# Patient Record
Sex: MALE | Race: WHITE
[De-identification: names, ages, dates, MRNs, and addresses within clinical notes are randomized per-mention and may not be internally consistent; named-entity substitution may affect disease eponyms.]

---

## 2021-04-05 ENCOUNTER — HOSPITAL ENCOUNTER (EMERGENCY)
Dept: HOSPITAL 35 - ER | Age: 71
LOS: 1 days | Discharge: PSYCHIATRIC OR SUBSTANCE USE FACILITY | End: 2021-04-06
Payer: COMMERCIAL

## 2021-04-05 VITALS — BODY MASS INDEX: 25.06 KG/M2 | HEIGHT: 72 IN | WEIGHT: 185.01 LBS

## 2021-04-05 DIAGNOSIS — Z95.1: ICD-10-CM

## 2021-04-05 DIAGNOSIS — K21.9: ICD-10-CM

## 2021-04-05 DIAGNOSIS — F23: Primary | ICD-10-CM

## 2021-04-05 DIAGNOSIS — Z20.822: ICD-10-CM

## 2021-04-05 DIAGNOSIS — J44.9: ICD-10-CM

## 2021-04-06 ENCOUNTER — HOSPITAL ENCOUNTER (INPATIENT)
Dept: HOSPITAL 35 - SBH | Age: 71
LOS: 10 days | Discharge: HOME | DRG: 885 | End: 2021-04-16
Attending: PSYCHIATRY & NEUROLOGY | Admitting: PSYCHIATRY & NEUROLOGY
Payer: COMMERCIAL

## 2021-04-06 VITALS — DIASTOLIC BLOOD PRESSURE: 84 MMHG | SYSTOLIC BLOOD PRESSURE: 148 MMHG

## 2021-04-06 VITALS — SYSTOLIC BLOOD PRESSURE: 146 MMHG | DIASTOLIC BLOOD PRESSURE: 86 MMHG

## 2021-04-06 VITALS — WEIGHT: 185.9 LBS | BODY MASS INDEX: 25.18 KG/M2 | HEIGHT: 72 IN

## 2021-04-06 VITALS — DIASTOLIC BLOOD PRESSURE: 78 MMHG | SYSTOLIC BLOOD PRESSURE: 125 MMHG

## 2021-04-06 VITALS — SYSTOLIC BLOOD PRESSURE: 123 MMHG | DIASTOLIC BLOOD PRESSURE: 80 MMHG

## 2021-04-06 DIAGNOSIS — Z87.891: ICD-10-CM

## 2021-04-06 DIAGNOSIS — Z95.1: ICD-10-CM

## 2021-04-06 DIAGNOSIS — Z85.46: ICD-10-CM

## 2021-04-06 DIAGNOSIS — Z98.52: ICD-10-CM

## 2021-04-06 DIAGNOSIS — K21.9: ICD-10-CM

## 2021-04-06 DIAGNOSIS — J02.9: ICD-10-CM

## 2021-04-06 DIAGNOSIS — J43.9: ICD-10-CM

## 2021-04-06 DIAGNOSIS — E78.5: ICD-10-CM

## 2021-04-06 DIAGNOSIS — I25.10: ICD-10-CM

## 2021-04-06 DIAGNOSIS — F43.10: ICD-10-CM

## 2021-04-06 DIAGNOSIS — F31.2: Primary | ICD-10-CM

## 2021-04-06 DIAGNOSIS — F12.90: ICD-10-CM

## 2021-04-06 DIAGNOSIS — I10: ICD-10-CM

## 2021-04-06 DIAGNOSIS — M19.90: ICD-10-CM

## 2021-04-06 DIAGNOSIS — F23: ICD-10-CM

## 2021-04-06 PROCEDURE — 10880: CPT

## 2021-04-06 NOTE — NUR
Assumed pt care at 0700. pt was alert and oriented x4. Ambulates with a steady
gait. participates in groups. took medication whole without difficulty.
Assessments completed,vss.no sign of acute distress noted upon assessments.
DEnies si/hi. denies pain. Pt was being inappropriate most of the day. pt
touches the female pts, and staffs inappropriately. pt was redirected
severally by staff. pt was told to stop touching other pts. pt continues to be
inappropriate, loud, unco-operative and disruptive. At 0906 pt got 15mg IM of
Geodon. At this time pt is in the day room. will continue to monitor pt.

## 2021-04-06 NOTE — NUR
WRITER WAS A WITNESS VIA PHONE FOR THE REENACTMENT OF DPOA PAPER WORK WITH
WIFE VIVEK REAVES. WIFE VOICED UNDERSTANDING AND AGREES WITH DR SESAY
PLAN OF CARE.

## 2021-04-06 NOTE — NUR
RECEIVED REPORT FROM ELOY WESTFALL IN ED. PT ARRIVED ON UNIT 0430 PT AAOX4, VS
B/P 146/86, P 55, R 18, T 97.2, 99% RA RR EVEN AND NONLABORED. PT LUNGS CLEAR,
HT S1, S2 RR, ABD ACTIVE. PT DENIES SI/HI AND PAIN. PT PRESENTS IN MANIC
STATE, EASILY REDIRECTED. HCP RINKU CANCHOLA MD AND FINA MONGE NP MESSAGE LEFT. PT
HX BIPOLAR, PTSD, COPD, GERD, OA, PROSTATE CA, CABG, PROSTECTOMY, C SPINE
SURGERY. ZERO S/S OF ACUTE [id number] NOTED, PT WILL CONTINUE TO BE MONITOR PER
[facility] PROTOCOL.

## 2021-04-06 NOTE — NUR
Assess due to admit to [facility].  Manic, hx bipolar, PTSD, CABG, substance abuse.
Eating 100% of first few meals on unit.  Wt status adequate.  Low nutrition
risk

## 2021-04-06 NOTE — NUR
PHAM sat in a meeting with pt and Dr. Mcintyre. Pt appeared very manic and
talked about a variety of topics. He admitted he uses LSD and marijuana. Pt
also said he was in residential in the early 1970s, but he did not say why. Pt also
says he is a . He says both his brother and his father suffered
from manic like MH dx.
 
PHAM and Dr. Mcintyre spoke with Blank his wife who said she is his DPOA. A
nursing staff gave PHAM the document. She confirmed that pt does use LSD and
marijuana. 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
formerly and acts like he has Alzheimers. She said he annoys anyone who is not
his family. She has asked that pt not be allowed to contact anyone that is not
family.
 
PHAM team will continue to follow pt during his stay on this unit.

## 2021-04-07 VITALS — DIASTOLIC BLOOD PRESSURE: 80 MMHG | SYSTOLIC BLOOD PRESSURE: 123 MMHG

## 2021-04-07 VITALS — SYSTOLIC BLOOD PRESSURE: 132 MMHG | DIASTOLIC BLOOD PRESSURE: 81 MMHG

## 2021-04-07 VITALS — SYSTOLIC BLOOD PRESSURE: 134 MMHG | DIASTOLIC BLOOD PRESSURE: 77 MMHG

## 2021-04-07 NOTE — NUR
PHAM received a msg from [place] asking for clarification on if pt is on a 96hr
hold.
 
PHAM and Dr. Mcintyre contacted Blank and explained to her that pt signed in
willingly; the possibility of a 96 came from [facility] and was not needed
since pt signed himself in. Yesterday, pt wanted to sign himself out but was
still very manic so Dr. Mcintyre enacted his DPOA so that he could not, and
the unit will now defer to Blank for decisions. Blank said she is in
agreement with that plan; she said that he is not to leave until the
psychiatrist says he is ready.
 
PHAM team will continue to follow pt during his stay on this unit.

## 2021-04-07 NOTE — NUR
THIS ASSUMED CARE AT 0700 THIS MORNING.  HE IS HYPERVERVAL.  HE IS ATTEMPTING
TO TAKE CARE OF PEERS. STAFF HAS BEEN ASKED BY STAFF NOT TO CARETAKE OTHERS.
HE SEEMED UPSET WITH THIS AND SAT IN A CHAIR BY HIMSELF.  HE ATTENDED MORNING
MEETING AND WAS INTERESTED IN TELLING THE STAFF IF THEY WERE DOING A GOOD JOB
OR NOT. ASSESSMENT IS UNREMARKABLE THIS MORNING.  HE TOOK HIS MORNING
MEDICATIONS WITHOUT PROBLEMS NOTED.

## 2021-04-08 VITALS — DIASTOLIC BLOOD PRESSURE: 81 MMHG | SYSTOLIC BLOOD PRESSURE: 117 MMHG

## 2021-04-08 VITALS — DIASTOLIC BLOOD PRESSURE: 80 MMHG | SYSTOLIC BLOOD PRESSURE: 128 MMHG

## 2021-04-08 VITALS — SYSTOLIC BLOOD PRESSURE: 117 MMHG | DIASTOLIC BLOOD PRESSURE: 81 MMHG

## 2021-04-08 NOTE — H
[facility]
[address]   [address]                     HISTORY AND PHYSICAL          
_______________________________________________________________________________
 
Name:       KAYLA REAVES                Room #:         517-A       ADM IN  
M.R.#:      [id number]                       Account #:      [id number]  
Admission:  04/06/21    Attend Phys:    Dalton Mcintyre DO
Discharge:              Date of Birth:  [birth year]  
                                                          Report #: [id number]
                                                                    [id number]   
_______________________________________________________________________________
THIS REPORT FOR:  
 
cc:  Michael Arroyo, Michael Arenas,Dalton CHOWDARY DO                                        ~
 
DATE OF SERVICE:  04/06/2021
 
 
INPATIENT PSYCHIATRIC EVALUATION
 
ATTENDING PHYSICIAN:  Dalton Mcintyre DO.
 
MEDICAL CONSULTANT:  Radha Samano and Zac Mcallister MD, Hospitalist service.
 
REASON FOR ADMISSION:  Acutely manic, patient is voluntary.
 
SOURCES OF INFORMATION:  [facility], telephone conversation
with Dr. Tee Agrawal, psychiatrist from the [facility], telephone conversation
with his wife, Tameka.
 
HISTORY OF PRESENT ILLNESS:  A 70-year-old   male who [facility] from Dr. Tee Agrawal, [facility] and he was in manic state, noncompliant with his medicines
experiencing flight of ideas.  His daughter describes increasingly impulsive
behavior and it is my concern that it is unsure if this patient presents as
grave danger to himself.  Second affidavit was from Emiliazena More, I believe is his
daughter, that one says the patient presents the likelihood of serious harm to
self.  He states he would like to have a near death experience.  He called the
police on himself to evaluate him and asked me if they would shoot him.  He has
gone to his general practitioner.  I believe he is looking for an
altercation with the police including possible violence, so they can induce his
"near death experience" by forcing them to shoot him.  The patient presents to
the likelihood of serious harm to his spouse.  His behavior is erratic as
threatening with his body.  He puts wife
in the corners.  He screams at her with such
force that spit flies from his mouth.  He says in the past when having a similar
manic episode, she tried to leave, and he would not let her and follows her next
door.  The daughter lives next door, screams outside the door.  He reports he
has Alzheimer's to others and is a danger, this is not true.  Additional
information, patient denied SI and psychosis.  However, he was hyperverbal,
tangential, grandiose, inappropriately and stated he is homicidal towards
Misael Monsalve, but he is not planning on doing anything.  The patient appears to
be ruminating over "a near death experience."  I believe there is nothing wrong
with him.  No history of past suicide attempt.  The patient reports his last
psychiatric hospitalization was 1974.
 
 
 
[facility]
[address] [place]   [address]                     HISTORY AND PHYSICAL          
_______________________________________________________________________________
 
Name:       KAYLA REAVES                Room #:         517-A       [facility]  
..#:      [id number]                       Account #:      [id number]  
Admission:  04/06/21    Attend Phys:    Dalton Mcintyre DO
Discharge:              Date of Birth:  [birth year]  
                                                          Report #: [id number]
                                                                    [id number]   
_______________________________________________________________________________
 
 
PAST MEDICAL HISTORY:  Biceps tendinitis of left upper extremity, cervical disk
disease, cervical spondylosis without myelopathy, chronic left shoulder pain,
COPD, coronary atherosclerosis of native coronary artery, esophageal reflux,
history of leukocytosis.
 
PAST SURGICAL HISTORY:  Vasectomy, prostate biopsy, Meniere's disease, mixed
hyperlipidemia, myelopathy, osteoarthritis, prostate cancer, visual impairment.
 
SURGERIES:  CABG without cardiopulmonary pump oxygenation left inferior
mesenteric artery to left anterior descending.
 
SURGEON:  Lane Pacheco, July 2018.
 
MORE 
PAST SURGICAL HISTORY:  He has had a cardiac catheterization, left cervical
discectomy, CABG transesophageal July 2018, knee surgery, right knee
replacement, prostatectomy, October 2018.
 
SOCIAL HISTORY:  Former smoker.  He smoked 2 packs a day for 40 pack years, quit
in 1999.  Never used smokeless tobacco, has daily marijuana use.
 
LABORATORY DATA:  UDS was positive for THC.  Urinalysis showed small bilirubin,
negative ketones, specific gravity was greater than 1, negative nitrites. 
Alcohol less than 10.  CBC:  White count 7.64, H and H of 15.6 and 46, platelets
279.  Sodium 138, potassium 3.5, chloride 107, bicarbonate 22, anion gap 9,
calcium 8.7, glucose 122, total protein 7.0, albumin 4.0, alkaline phosphatase
97.  COVID PCR is negative, got 5 mg of Haldol, 2 mg Ativan in the ER.  
 
From wife: 
  This is the first time the patient has been
going around the community, particularly bothering people in a manic state.  He
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  EKG was done,
ventricular rate 86, QTc 425, NC interval 128.
 
PHYSICAL EXAMINATION:
VITAL SIGNS:  Here at [place], temperature 36.1, pulse 82, respirations 18,
/84, O2 sat 98%.
MUSCULOSKELETAL:  Unkempt,  male,
increased pace of gait.  Normal station.
 
MENTAL STATUS EXAMINATION:  This is a well-developed  male, appearing
stated age with long curly hair.  Attention and concentration limited, easily
distractible.  Speech pressured.  Thought process linear at times, becoming
tangential.  Thought content delusional-  about what he has been doing
 
 
 
[facility]
[address] [place]   [address]                     HISTORY AND PHYSICAL          
_______________________________________________________________________________
 
Name:       KAYLA REAVES                Room #:         517-A       [facility] [place]  
..#:      [id number]                       Account #:      [id number]  
Admission:  04/06/21    Attend Phys:    Dalton Mcintyre, 
Discharge:              Date of Birth:  [birth year]  
                                                          Report #: [id number]
                                                                    [id number]   
_______________________________________________________________________________
 
compared to what his family. mood/affect- euphoric, grandiose- manic,congruent
Medical record document very disorganized.  Denied
SI or HI.  Denied auditory, visual, or tactile hallucinations.  Memory not
formally tested.  Insight impaired, judgment impaired.  Fund of knowledge, no
greater than average.
 
FORMULATION:  A 70-year-old  male, history of bipolar 1 disorder,
presenting frankly manic, his comorbidities including coronary artery disease,
degenerative disk disease.
 
DIAGNOSES:  At this time, bipolar 1 disorder, most recent episode manic with
psychotic features, substance use disorder for marijuana, substance use disorder
from tobacco by history.
 
PLAN:  Evaluate, stabilize, obtain collateral.  Regarding the patient's
medications, we had him on aspirin, atorvastatin and famotidine.  We are going
to go ahead and start him on Depakote 500 mg twice a day, Haldol 2 mg 3 times a
day to see if we can get this kacey under control.  Right now he is voluntary. 
At this point, he will not be allowed to leave against medical advice.
 
Time spent on this case is greater than 70 minutes, greater than 50% time spent
on review of records, coordination of care.
 
STRENGTHS:  insured, supportive family.
 
WEAKNESSES:  Advanced age, medical comorbidities.
 
 
 
 
 
 
 
 
 
 
 
 
 
 
 
 
 
  <ELECTRONICALLY SIGNED>
   By: Dalton Mcintyre DO        
  04/08/21     2110
D: 04/06/21 1304                           _____________________________________
T: 04/06/21 1400                           Dalton Mcintyre DO          /nt

## 2021-04-08 NOTE — NUR
ASSUMED CARE AT 0700 THIS MORNING.  PT. COOPERATIVE WITH ASSESSMENT AND
MEDICATIONS.  HE STATES HE FEELS BETTER TODAY AND FEELS THE MEDICATIONS ARE
WORKING.  HE THEN STATES HE THINKS HE SHOULD BE DISCHARGED BY FRIDAY THIS
WEEK.  THIS WRITER TALKED TO HIM THAT ONE POSITIVE REPORT IS NOT TELL US ALL
WE NEED TO KNOW AND HE STILL NEEDS TO BE UNDER OBSERVATION.  HE ATTENDED
MORNING GROUP.  NO NEW PROBLEMS NOTED OR VOICED.  WILL CONTINUE TO MONITOR.

## 2021-04-08 NOTE — NUR
4-7-21 CARE TRANSFERRED 1900 OBSERVED PT SITTING IN DAY ROOM SOCIALIZING WITH
PEER. LATER PT AAOX4, VSS, RR EVEN AND NONLABORED ON RA, PT DENIES PAIN AND
SI/HI. PT HAS REMAINED CALM AND COOPERATIVE. ZERO S/S OF ACUTE DISTRESS NOTED,
PT WILL CONTINUE TO BE MONITOR PER [facility] PROTOCOL.

## 2021-04-09 VITALS — DIASTOLIC BLOOD PRESSURE: 86 MMHG | SYSTOLIC BLOOD PRESSURE: 127 MMHG

## 2021-04-09 VITALS — SYSTOLIC BLOOD PRESSURE: 127 MMHG | DIASTOLIC BLOOD PRESSURE: 86 MMHG

## 2021-04-09 VITALS — DIASTOLIC BLOOD PRESSURE: 81 MMHG | SYSTOLIC BLOOD PRESSURE: 132 MMHG

## 2021-04-09 NOTE — NUR
Assumed care on 04/08/21 @ 1900, cooperative with care a&Marvel4, ANIA, took meds
whole. Ambulates with a steady gait.

## 2021-04-09 NOTE — NUR
PT SITTING IN DINING ROOM EATING BREAKFAST. PT ASKED WHAT MEDS HE WAS TAKING.
TOLD PT WHAT EACH ONE MED WAS AND HE DID TAKE THEM. PT COOROPERATIVE WITH MEDS
THIS AM. NO SIGNS OF ANXIETY.

## 2021-04-09 NOTE — NUR
PT WAS DRAWING A PLAN FOR A FESTIVAL AT [place] FOR CONCERTS HE WANTS TO
PLAN. HE HAS A DRAWING OF THE RIVER AND WHERE THE STAGE IS AND ALSO WHERE THE
VENDORS WOULD BE. PT STATED IT WOULD COST AROUND $45,000.

## 2021-04-09 NOTE — NUR
PHAM spoke with wife Blank and scheduled an in-person family meeting on 04/13
@11:45; this will occur after she visits with pt to see if he is closer to his
baseline. She voiced a few issues to PHAM such as pt has license to grow weed
for him and 2 other people. She said he grows a "sativa" strand which gives
energy, and she is afraid of him smoking that. She says she absolutely does
not want him to smoke that. She asked SW if she could destoy those plants. PHAM
explained she does not know, but that can be discussed at the . She said ok.
 
PHAM team will continue to follow pt during his stay on this unit.

## 2021-04-09 NOTE — NUR
PT HAS HAD A GOOD DAY TODAY. NO SIGNS OF LUIZ EXCEPT FOR THE CONCERT
PLANNING. PT VERY TALKATIVE WITH OTHER PATIENTS AND PERSONABLE.

## 2021-04-10 VITALS — SYSTOLIC BLOOD PRESSURE: 135 MMHG | DIASTOLIC BLOOD PRESSURE: 99 MMHG

## 2021-04-10 VITALS — DIASTOLIC BLOOD PRESSURE: 89 MMHG | SYSTOLIC BLOOD PRESSURE: 139 MMHG

## 2021-04-10 VITALS — DIASTOLIC BLOOD PRESSURE: 99 MMHG | SYSTOLIC BLOOD PRESSURE: 133 MMHG

## 2021-04-10 NOTE — NUR
1110 RESUMMED CARE FROM OVERNIGHT SHIFT THIS AM, PATIENT IN DAY ROOM SITTING
QUIETLY IN BALDEV CHAIR. PATIENT ATE BREAKFAST TOOK MEDICATION WITHOUT
INCIDENCE. PATIENT ALERT ORIENTED TIMES 4 PATIENT DENIES SI/HI/AH/VH AT
PRESENT. PATIENTS ABDOMEN SOFT BOWEL SOUNDS PRESENT PATIENTS LUNGS CLEAR.
PATIENT CALM COOPERATIVE PATIENT SPOKE WITH HIS WIFE CONVERSATION APPROPRIATE.
WILL CONTINUE TO MONITOR PATIENT FOR SAFETY AND BEHAVIORS.

## 2021-04-11 VITALS — SYSTOLIC BLOOD PRESSURE: 128 MMHG | DIASTOLIC BLOOD PRESSURE: 86 MMHG

## 2021-04-11 VITALS — SYSTOLIC BLOOD PRESSURE: 126 MMHG | DIASTOLIC BLOOD PRESSURE: 78 MMHG

## 2021-04-11 NOTE — NUR
PHAM met with pt to discuss his concerns re: when he will discharge. SW informed
that discharge would take place at some point after the family meeting on
Tuesday. Pt also inquired about wearing his shoes. SW discussed his request
with his nursing staff.
 
SW team will remain available.

## 2021-04-11 NOTE — NUR
1137 RESUMMED CARE FROM OVERNIGHT SHIFT THIS AM, PATIENT IN DAY ROOM ASLEEP IN
BALDEV CHAIR. PATIENT DID NOT WANT TO SLEEP IN HIS BED LAST NIGHT; PATIENT ALERT
ORIENTED TIMES 4. PATIENT DENIES SI/HI/AH/VH AT PRESENT PATIENTS ABDOMEN SOFT
BOWEL SOUNDS PRESENT. PATIENTS LUNGS CLEAR PATIENT HAS NOT DISPLAYED ANY
BEHAVIORS. SOMETIMES PARTICIPATES IN GROUPS WILL CONTINUE TO MONITOR PATIENT
FOR SAFETY AND BEHAVIORS.

## 2021-04-11 NOTE — NUR
Assumed care on 04/10/21 @ 2100, A&Ox1 pleasant affect noted, denies
HA/HV/SI/HI. Called wife and complied with request to speak to her within the
area near the nurses desk, so that staff is able to monitor calls for
apropriate interaction, and to prevent additional calls to unknown others. PRN
Tylenol 650 provided @ 2000 for generalized pain. Slept in wilian chair in the
day room that he positioned in the corner at the far end of the room.

## 2021-04-12 VITALS — SYSTOLIC BLOOD PRESSURE: 141 MMHG | DIASTOLIC BLOOD PRESSURE: 87 MMHG

## 2021-04-12 VITALS — DIASTOLIC BLOOD PRESSURE: 87 MMHG | SYSTOLIC BLOOD PRESSURE: 141 MMHG

## 2021-04-12 VITALS — DIASTOLIC BLOOD PRESSURE: 88 MMHG | SYSTOLIC BLOOD PRESSURE: 143 MMHG

## 2021-04-12 NOTE — NUR
PT LYING IN RECLINER IN DINING ROOM. PT STATED HIS KNEES HURT AND HE DIDN'T
KNOW IF IT WAS FROM WALKING AROUND. ADM TYLENOL 325MG 2 TABS PO FOR PAIN TO
KNEES OF 4 ON 1-10 SCALE. PT STATED HIS GOAL WAS TO BEHAVE TODAY.

## 2021-04-12 NOTE — NUR
4-11-21 CARE TRANSFERRED 1900 OBSERVED PT WALKING IN HALLWAY. LATER PT AAOX4,
VSS, RR EVEN AND NONLABORED ON RA. PT DENIES SI/HI. PT REPORTS PAIN IN KNEES
AND RATES 6 ON 0-10 SCALE, PAIN HAS BEEN MANAGED WITH PRN MEDICATION. PT
HAS REMAINED CALM AND COOPERATIVE, LATER PT BED WAS ADJUSTED FOR COMFORT. ZERO
S/S OF ACUTE DISTRESS NOTED, PT WILL CONTINUE TO BE MONITOR PER [facility] PROTOCOL.

## 2021-04-13 VITALS — DIASTOLIC BLOOD PRESSURE: 77 MMHG | SYSTOLIC BLOOD PRESSURE: 129 MMHG

## 2021-04-13 VITALS — DIASTOLIC BLOOD PRESSURE: 86 MMHG | SYSTOLIC BLOOD PRESSURE: 140 MMHG

## 2021-04-13 NOTE — NUR
Followup: continues admission on [facility].  Eating >80% meals, able to order own
choices from alternative menu requesting options to staff.  Wt 185-189 lb, BMI
25.  Low nutrition risk

## 2021-04-13 NOTE — NUR
4-12-21 CARE TRANSFERRED 1900 OBSERVED PT RESTING IN RECLINER WITH EYES CLOSED
IN DAY ROOM. LATER PT AAOX4, VSS, RR EVEN AND NONLABORED ON RA. PT DENIES
SI/HI AND PAIN AT THIS TIME. PT PRESENTS CALM AND COOPERATIVE, ZERO S/S OF
ACUTE DISTRESS NOTED. PT WILL CONTINUE TO BE MONITOR PER [facility] PROTOCOL.

## 2021-04-13 NOTE — NUR
0930 RESUMMED CARE FROM OVERNIGHT SHIFT THIS AM, PATIENT IN DAY ROOM SITTING
QUIET IN BALDEV CHAIR. PATIENT ALERT ORIENTED TIMES 4 PATIENT DENIES SI/HI/AH/VH
AT PRESENT. PATIENTS ABDOMEN SOFT BOWEL SOUNDS PRESENT PATIENT'S LUNGS CLEAR.
PATIENT DOES ATTEND GROUPS CALM COOPERATIVE NO BEHAVIORS WILL CONTINUE TO
MONITOR PATIENT.

## 2021-04-14 VITALS — SYSTOLIC BLOOD PRESSURE: 139 MMHG | DIASTOLIC BLOOD PRESSURE: 76 MMHG

## 2021-04-14 NOTE — NUR
PT UP WALKING AROUND THE UNIT. PT STATED HE IS GETTING UNCOMFORTABLE IN HIS
SKIN. 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 HE STATED
HE IS GETTING BORED. PT STATED HE HAS SOME DISCOMFORT TO KNEES. PT CALM, AND
COOROPERATIVE WITH MEDS.

## 2021-04-14 NOTE — NUR
PHAM and Dr. Mcintyre met with pt's daughter Emilia and wife Blank (DPOA). Both
Emilia and Blank said while pt shows improvement, he is still not at his
baseline. They said he would never talk about a hierarchy and he is much more
caring than he appears. The decision was made, against pt's wishes, that pt
will get his labs back and determine if he needs an adjustment then. Emilia
will come on Friday to visit pt and see how he presents and that will
determine if he can discharge before the weekend.
 
It was discussed in tx team that pt presented hyperverbal yesterday afternoon,
and at times was down in his mood.
 
SW team will continue to follow pt during his stay on this unit.

## 2021-04-14 NOTE — NUR
HAS BEEN ATTENDING SCHEDULED ACTIVITIES THIS AM. FULL RANGE AFFECT. DENIES
SI.SH/HI. NO PSYCHOSIS,BEHAVIORAL ISSUES NOTED OR REPORTED.

## 2021-04-14 NOTE — NUR
RT Progress Note- Martinez has been active in both the milieu and recreation
therapy groups since his admission to [facility]. During Martinez' first few days of
participation he showed highly manic behaviors and poor social boundaries. He
has since become a productive participant of group, displaying appropriate
boundaries, with little need for redirection for caretaking behaviors. Pt
expresses decent insight into his behaviors at one point identifying; "I had a
visit with my wife today. She is still aprehensive of me coming home with her.
It was a reminder that I need to continue working." He remains goal oriented
with support of pt peers and staff. CTRS will continue to encourage
participation and plan.

## 2021-04-14 NOTE — NUR
4-13-21 CARE TRANSFERRED 1915 OBSERVED PT SITTING IN RECLINER IN DAY ROOM WITH
EYES CLOSED. LATER PT AAOX4, VSS, RR EVEN AND NONLABORED ON RA, PT DENIES
SI/HI AND PAIN. DURING MEDICATION ADMIN PT REPORTED HAVING A ROUGH DAY R/T HIS
FAMILY MEETING, PT IS READY TO GO HOME. PT HAS REMAINED CALM AND COOPERATIVE.
ZERO S/S OF ACUTE DISTRESS NOTED, PT WILL CONTINUE TO BE MONITOR PER [facility]
PROTOCOL.

## 2021-04-15 VITALS — SYSTOLIC BLOOD PRESSURE: 130 MMHG | DIASTOLIC BLOOD PRESSURE: 83 MMHG

## 2021-04-15 VITALS — SYSTOLIC BLOOD PRESSURE: 119 MMHG | DIASTOLIC BLOOD PRESSURE: 82 MMHG

## 2021-04-15 VITALS — DIASTOLIC BLOOD PRESSURE: 82 MMHG | SYSTOLIC BLOOD PRESSURE: 119 MMHG

## 2021-04-15 NOTE — NUR
PT ALREADY IN BED. PT HAD TO BE AWOKE FOR MEDS AND KNEE CREAM. PT VOICE
SOUNDED GARBLED. PT STATED HE HAD A LUMP TO LEFT SIDE OF HIS NECK, PT STATED
MEDICAL DOCTOR DID ASSESS HIS NECK. ENCOURAGED PT TO DRINK WATER BEFORE TAKING
MEDICATION. PT DID SWALLOW ONE PILL AT A TIME, PT WANTED MEDS CRUSHED. STATED
THAT THE DEPAKOTE IS NOT ABLE TO BE CRUSHED DUE TO LONG ACTING. DID TRY TO GET
SPRINKLES ORDERED, UNABLE DUE TO MEDS BEING LONG ACTING. PT DID HAVE SOME
PUDDING AFTER MEDS. PT STATED HE HAS BEEN HAVING GREAT SLEEP AND DREAMS FROM
MEDS.

## 2021-04-15 NOTE — NUR
Assumed pt care at 0700. pt was alert and oriented x4. pt took his meds whole,
no difficulty noted. Denies si/hi. denies pain at this time. ambulates with a
steady gait. Assessments completed, vss. there was no sign of acute distress
noted upon assessments. Participated in groups calm and co-operative with
care. AT this time pt is sitting in the day room watching TV. Will continue to
monitor pt.

## 2021-04-15 NOTE — NUR
Prior to RT group pt expressed uncertainty that he would be able to
participate in group d/t his throat feeling as if it was "closing up" and "I
can't hardly swallow." Pt was brought a throat lozenge by nursing and agreed
to try to attend group as a method of distraction.
During group, pt alternated from sitting with his head on the table or
standing with a very anxious facial expression. He stated, "I don't know if I
can do this. Actually, I can't. I'm going to pass out." CTRS called for
nursing assistance. Dr. Mcintyre responded and walked pt to the day room.
Following group, pt sought out CTRS to apologize for his inability to
participate in group. CTRS expressed understanding and wished pt to feel
better. Aprx 10 minutes later Mateo approached CTRS with much urgency asking her
to go to RT office and look up a yoga term on the computer. Pt expressed that
he was experiencing a particular yoga phenomenon much like "chakra" and he
wanted to google how to reverse the feeling. CTRS encourage pt to instead
return to his room and practice meditation or breathing exercises. Pt complied
but appeared frustrated that he could not find the answer he was looking for.

## 2021-04-15 NOTE — NUR
PT LYING IN RECLINER CHAIR THIS ARIEL AND HE STATED DON'T FORGET ABOUT MY KNEE
CREAM. PT WATCHING BASEBALL GAME WITH PEERS.

## 2021-04-16 VITALS — SYSTOLIC BLOOD PRESSURE: 130 MMHG | DIASTOLIC BLOOD PRESSURE: 83 MMHG

## 2021-04-16 VITALS — SYSTOLIC BLOOD PRESSURE: 139 MMHG | DIASTOLIC BLOOD PRESSURE: 87 MMHG

## 2021-04-16 NOTE — NUR
PHAM was notified that pt's daughter visited and sees much improvement in pt.
She is comfortable with him discharging today at 1600.
 
PHAM contacted [facility] and scheduled an appt for pt on
Monday 04/19/2021 @10am for outpatient services including a PHP program. PHAM
was given the fax number of [phone] to forward pt's discharge docs to.
 
PHAM contacted Blank and maribell Nieto [phone] and provided to them both
updates.
 
PHAM created a PHAM handout with pt's appt info and explained this to pt. PHAM also
gave pt's nurse a copy to put in his discharge packet.
 
PHAM team will continue to follow pt during his stay on this unit.

## 2021-04-16 NOTE — NUR
SW D/C NOTE
 
SW faxed to [facility] behavioral pt's d/c docs. SW will file docs in pt's
hospital file.
 
No other needs for SW team to address at this time.

## 2021-04-16 NOTE — NUR
ASSUMED CARE AT 0700 THIS MORNING.  PT. UP, DRESSED AND IN THE DINING ROOM FOR
BREAKFAST.  HE IS PLEASANT AND COOPERATIVE.  HE IS [place] AND DOES NOT HAVE HIS
HEARING AIDES HERE.  HE TOOK IS MEDICATIONS WITHOUT PROBLEMS.  HE IS PLEASANT
TO ALL THE PATIENTSAND STAFF.  HE CONTINUES TO TELL THIS RN HE IS CONFIDENT
THAT HE IS LEAVING TODAY.  THIS RN INFORMED HIM THAT THERE IS NO ORDER TO DO
SO TODAY AT THIS TIME.  HE ACCEPTED THIS AND WENT INTO THE DINING ROOM.  HE
ATTENDED GROUPS/MEALS.  NO NEW PROBLEMS NOTED OR VOICED.

## 2021-04-16 NOTE — NUR
PT WANTED HEAD OF BED DOWN IN HIS BED. PT APPOLIGIZING FOR KEEPING TO BOTHER
THIS WRITER. PT STATED IF I CAN'T GO TO SLEEP WITH HEAD DOWN, HE WAS JUST
GOING TO STAY UP.

## 2021-04-16 NOTE — NUR
PT BACK UP AND WANTING TYLENOL TO HELP WITH REST. ADM TYLENOL 325MG 2 TABS FOR
PAIN TO KNEES OF 4 ON 1-10 SCALE.

## 2021-04-16 NOTE — NUR
PT HAS AWOKE 3 TIMES THIS ARIEL. PT WANTED HIS HEAD OF BED ELEVATED TO HELP WITH
SLEEP. OFFERED TYLENOL AND ANOTHER BLANKET. PT REFUSED. PT STATED IF HE CAN'T
GET TO SLEEP WITH THE HEAD OF BED ELEVATED HE WOULD COME OUT AND SLEEP IN
RECLINER. PT STATED IF IT WAS 0400 HE WOULD GET UP, BUT NOT NOW ITS TOO EARLY.

## 2021-04-17 NOTE — D
[facility]
[address] [place]   [address]                     DISCHARGE SUMMARY             
_______________________________________________________________________________
 
Name:       KAYLA REAVES                Room #:         517-A       [facility] [place]  
M.R.#:      [id number]                       Account #:      [id number]  
Admission:  04/06/21    Attend Phys:    Dalton Mcintyre DO
Discharge:  04/16/21    Date of Birth:  [birth year]  
                                                          Report #: [id number]
                                                                    [id number]   
_______________________________________________________________________________
THIS REPORT FOR:  
 
cc:  Michael Arroyo, Michael Arenas,Dalton CHOWDARY DO                                        ~
DATE OF SERVICE:  04/16/2021
 
 
INPATIENT PSYCHIATRIC DISCHARGE SUMMARY
 
ATTENDING PSYCHIATRIST:  Dalton Mcintyre DO.
 
MEDICAL CONSULTANT:  Zac Mcallister MD.
 
OUTPATIENT PSYCHIATRIST:  Dr. Tee Agrawal.
 
DISCHARGE DIAGNOSES:  Bipolar 1 disorder, most recent episode, severe manic with
psychotic features, improved.  Marijuana use disorder.
 
DISCHARGE DIAGNOSES:  As follows, coronary artery disease, status post coronary
artery bypass graft, on aspirin and statin.  Emphysema, as needed inhalers. 
Sore throat, Cepacol lozenge p.r.n.  History of prostate cancer, status post
prostatectomy, status post PCDS.  Osteoarthritis.
 
DISCHARGE PLAN:  The patient is discharging to his daughter's home.
 
ACTIVITY LEVEL:  As tolerated.
 
No alcohol, no illicit drugs, specifically avoid marijuana as that may trigger
his kacey.
 
DIET:  Essentially regular diet.
 
DISCHARGE MEDICATIONS:  Atorvastatin, calcium 40 mg oral daily, aspirin 81 mg
oral daily for heart protection, atorvastatin for hyperlipidemia, Depakote ER
1500 mg oral at bedtime.  Blood level at that dose was 55 last night. 
Haloperidol 5 mg oral twice daily, pantoprazole 40 mg oral twice per day.  The
patient is given 30-day prescriptions for all of his medications.  The patient
has been referred to a partial hospitalization program.  There was some
difficulty with getting him into [facility]'s PHP
program so he is going to go to the [facility] by [facility].  The patient is to start that Monday; psychiatric followup
will be included with PHP.  The patient is encouraged to see his primary care
physician within 1 month.
 
LABORATORY DATA:  Significant laboratories this admission:  COVID-19 PCR was
 
 
 
[address]   [address]                     DISCHARGE SUMMARY             
_______________________________________________________________________________
 
Name:       KAYLA REAVES                Room #:         517-A       [facility] [place]  
[facility]#:      [id number]                       Account #:      [id number]  
Admission:  04/06/21    Attend Phys:    Dalton Mcintyre DO
Discharge:  04/16/21    Date of Birth:  [birth year]  
                                                          Report #: [id number]
                                                                    [id number]   
_______________________________________________________________________________
negative.  Otherwise, labs were done at outside hospital.
 
REASON FOR ADMISSION:  Back on 04/06/2021 is as follows:  A 70-year-old
 male referred by Dr. Tee Agrawal, as he showed up with Dr. Agrawal's
office frankly manic.
 
HOSPITAL COURSE:  The patient was admitted to Geriatric Psychiatry Unit.  He was
started on haloperidol as well as Depakote; it was titrated to 1500 mg after a
low-blood level of around 40 on a 1000 mg.  Haldol was adjusted to 5 mg twice a
day.  During the course of the admission, the patient became less manic, slept
better, was more receptive to comments of his family members.  Family meeting
was held towards the middle of the admission with his wife or his daughter.  His
wife is going out of town at the end of the week.  Daughter agreed for the
patient to stay with the patient.  PHP program is encouraged.
 
PHYSICAL EXAMINATION:
VITAL SIGNS:  Temperature 36.7, pulse 67, respirations 17, /87, O2 sat
98%.
MUSCULOSKELETAL:  Unkempt.  Normal gait and station.
 
MENTAL STATUS EXAMINATION:  This is a well-developed  male apparently
of stated age.  Attention fair.  Concentration fair.  Speech is normal in rate,
rhythm and tone.  Thought process is linear and goal directed.  Thought content
focused on discharge.  Mood and affect is euthymic, congruent, fair range. 
Denied suicidal or homicidal ideation.  Denies auditory, visual, or tactile
hallucinations.  Denied helplessness, hopelessness.  Memory not formally tested.
 Insight fair to limited.  Judgment fair.  Fund of knowledge at least average.
 
PROGNOSIS:  Prognosis for this patient is fair if he maintains in psychiatric
treatment and therefore minimizing the chance of future episodes of kacey.  I
think a normal life expectancy is reasonable.
 
 
 
 
 
 
 
 
 
 
 
 
 
  <ELECTRONICALLY SIGNED>
   By: Dalton Mcintyre DO        
  04/17/21     0035
D: 04/16/21 1723                           _____________________________________
T: 04/16/21 1811                           Dalton Mcintyre DO          /nt